# Patient Record
Sex: FEMALE | URBAN - METROPOLITAN AREA
[De-identification: names, ages, dates, MRNs, and addresses within clinical notes are randomized per-mention and may not be internally consistent; named-entity substitution may affect disease eponyms.]

---

## 2019-02-07 ENCOUNTER — TELEPHONE (OUTPATIENT)
Dept: NEUROLOGY | Age: 59
End: 2019-02-07

## 2019-04-17 ENCOUNTER — OFFICE VISIT (OUTPATIENT)
Dept: NEUROLOGY | Age: 59
End: 2019-04-17
Payer: COMMERCIAL

## 2019-04-17 VITALS
WEIGHT: 101 LBS | BODY MASS INDEX: 20.36 KG/M2 | SYSTOLIC BLOOD PRESSURE: 120 MMHG | HEIGHT: 59 IN | DIASTOLIC BLOOD PRESSURE: 80 MMHG

## 2019-04-17 DIAGNOSIS — G62.9 NEUROPATHY: Chronic | ICD-10-CM

## 2019-04-17 DIAGNOSIS — G57.93 NEUROPATHIC PAIN OF BOTH FEET: Chronic | ICD-10-CM

## 2019-04-17 PROCEDURE — 99215 OFFICE O/P EST HI 40 MIN: CPT | Performed by: PSYCHIATRY & NEUROLOGY

## 2019-04-17 RX ORDER — DULOXETIN HYDROCHLORIDE 60 MG/1
60 CAPSULE, DELAYED RELEASE ORAL DAILY
Refills: 0 | COMMUNITY
Start: 2019-04-08

## 2019-04-17 RX ORDER — RANITIDINE 150 MG/1
TABLET ORAL
Refills: 12 | COMMUNITY
Start: 2019-03-28

## 2019-04-17 RX ORDER — CROMOLYN SODIUM 100 MG/5ML
SOLUTION, CONCENTRATE ORAL
Refills: 5 | COMMUNITY
Start: 2019-03-27

## 2019-04-17 RX ORDER — LISINOPRIL 2.5 MG/1
5 TABLET ORAL DAILY
COMMUNITY
Start: 2019-01-24

## 2019-04-17 RX ORDER — ABACAVIR 300 MG/1
300 TABLET ORAL DAILY
COMMUNITY

## 2019-04-17 RX ORDER — LANSOPRAZOLE 15 MG/1
30 CAPSULE, DELAYED RELEASE ORAL DAILY
COMMUNITY

## 2019-04-17 RX ORDER — METOPROLOL TARTRATE 50 MG/1
50 TABLET, FILM COATED ORAL 2 TIMES DAILY
COMMUNITY

## 2019-04-17 RX ORDER — GABAPENTIN 300 MG/1
CAPSULE ORAL
Refills: 0 | COMMUNITY
Start: 2019-04-14

## 2019-04-17 RX ORDER — CARBINOXAMINE MALEATE 4 MG/1
4 TABLET ORAL 2 TIMES DAILY
COMMUNITY

## 2019-04-17 RX ORDER — LEVOTHYROXINE SODIUM 88 UG/1
88 TABLET ORAL
COMMUNITY
Start: 2016-11-04

## 2019-04-17 RX ORDER — DOXYCYCLINE HYCLATE 100 MG/1
CAPSULE ORAL
Refills: 2 | COMMUNITY
Start: 2019-03-28

## 2019-04-17 ASSESSMENT — ENCOUNTER SYMPTOMS
ALLERGIC/IMMUNOLOGIC NEGATIVE: 1
GASTROINTESTINAL NEGATIVE: 1
EYES NEGATIVE: 1
RESPIRATORY NEGATIVE: 1

## 2019-04-17 NOTE — PROGRESS NOTES
Neurology Consult Note:    Patient: Chan Gallagher  : 1960  Date: 19  Referring provider:  Hermelinda Canchola [facility]. Referral to Neurology: History of small fiber peripheral neuropathy, chronic neuropathic pain of feet x 4 yrs. Note: Prior patient of Dr. Selina Plasencia, Neurology, [facility]. Dear Dr. Enoc Ricketts;     I have seen Chan Gallagher a 18-year-old woman who is a former patient of Dr. Selina Plasencia and was apparently diagnosed with a small fiber peripheral polyneuropathy and chronic neuropathic pain syndrome of her feet x 4 yrs. and then had a change of insurances, thus was scheduled in my office. She also is noted to have been a patient at the [facility] in 2016 for neuropathy. We have no records from the [facility] correlating with the above. She has no primary medical physician listed or on file, except reports she sees a dr. Enoc Ricketts every few months in [facility]. She also reports a history of POTS. She describes neuropathy symptoms of her feet x 4 yrs. without apparent progression of symptoms when questioned further. She denies cervical or lumbar radicular symptoms. She did not have a skin biopsy under Dr. Selina Plasencia as the patient reports she did not feel this was necessary nor would change treatment. She is looking for chronic pain management. She has a history of fibromyalgia which I do not treat as a neurologist. She reports that her primary medical doctor is prescribing Cymbalta 60 mg daily. She states that she has only 9 days of gabapentin left. I have advised her that I do not prescribe gabapentin for chronic pain and discussed that there are prescribing limitations in [place] of which she was aware. She was also treated with amitriptyline 50 mg at bedtime previously in 2018 per Dr. Selina Plasencia. Lab Data: none on file.  (Patient states she had numerous labs done at the [facility] in 2016, and could determine no specific cause for her neuropathy.)    Medical records reviewed from [facility] [place] [id number], Dr. Melissa Langford, 10/4/18. Dr. Mykel Caal note indicates that patient reported she could be having an adverse interaction to gabapentin stating that she felt \"horrible\" in the morning after taking her gabapentin and complained of tingling and burning of her legs and feet that did not not want to discontinue gabapentin, was advised then to take it at bedtime. NCS/EMG study completed 5/10/17, of the lower extremities, Dr. Melissa Langford: no clear evidence of neuropathic, myopathic, motor nerve root pathology or large fiber neuropathy. Current Outpatient Medications   Medication Sig Dispense Refill    ascorbic acid (VITAMIN C) 1000 MG tablet Take 1,000 mg by mouth daily      Probiotic Product (ALIGN PO) Take by mouth daily       No current facility-administered medications for this visit. No Known Allergies    Patient Active Problem List   Diagnosis    Neuropathy    Neuropathic pain of both feet       Past Medical History:   Diagnosis Date    Neuropathic pain of both feet 4/17/2019    Neuropathy 4/17/2019       No past surgical history on file. No family history on file. Denies a family history of chronic neuromuscular conditions or hereditary neuropathy.     Social History     Socioeconomic History    Marital status: Unknown     Spouse name: Not on file    Number of children: Not on file    Years of education: Not on file    Highest education level: Not on file   Occupational History    Not on file   Social Needs    Financial resource strain: Not on file    Food insecurity:     Worry: Not on file     Inability: Not on file    Transportation needs:     Medical: Not on file     Non-medical: Not on file   Tobacco Use    Smoking status: Never Smoker    Smokeless tobacco: Never Used   Substance and Sexual Activity    Alcohol use: Not Currently    Drug use: Not Currently    Sexual activity: Not on file   Lifestyle    Physical activity:     Days per week: Not on file     Minutes per session: Not on file    Stress: Not on file   Relationships    Social connections:     Talks on phone: Not on file     Gets together: Not on file     Attends Anabaptism service: Not on file     Active member of club or organization: Not on file     Attends meetings of clubs or organizations: Not on file     Relationship status: Not on file    Intimate partner violence:     Fear of current or ex partner: Not on file     Emotionally abused: Not on file     Physically abused: Not on file     Forced sexual activity: Not on file   Other Topics Concern    Not on file   Social History Narrative    Not on file     Review of Systems   Constitutional: Negative. HENT: Negative. Eyes: Negative. Respiratory: Negative. Cardiovascular: Negative. Gastrointestinal: Negative. Endocrine: Negative. Genitourinary: Negative. Musculoskeletal:        Chronic neuropathic pain of feet, small fiber neuropathy by history   Skin: Negative. Allergic/Immunologic: Negative. Neurological: Negative. Hematological: Negative. Psychiatric/Behavioral: Positive for dysphoric mood. The patient is nervous/anxious. All other systems reviewed and are negative. Neurologic Exam:  /80 (Site: Right Upper Arm, Position: Sitting, Cuff Size: Medium Adult)   Ht 4' 11\" (1.499 m)   Wt 101 lb (45.8 kg)   BMI 20.40 kg/m²   General appearance: Alert, cooperative, well-nourished, well-groomed, anxious, mildly agitated, seated in the exam room in the company of her spells, no acute distress  HEENT: Normocephalic/atraumatic. Neck: Supple  Cardiac: RRR  Respiratory: grossly clear  Extremities: No edema, erythema, cyanosis  Skin: No apparent lesions or rashes  Musculoskeletal: Negative bilateral straight leg raising test, no fasciculations, tremors or foot drop, no truncal sensory level.   Mental Status: Alert, oriented ×3  Speech/Language: Clear, grossly fluent  Attention span/Concentration: Grossly intact  Affect/Mood: Mildly anxious, dysthymic, mildly agitated  Insight/Judgement: Appears fairly good     Fund of Knowledge/Current events: Unable to accurately assess  CN II-XII:     Pupils: Equal, reactive to light, 1.4 mm     EOM's: Full without nystagmus    Visual Fields: Grossly full to confrontation    Fundi: Miosis from light  CN V: normal V1-V3  CN VII: No facial droop, symmetric smile   CN VIII: Hearing grossly intact   CN IX-XII: No palatal asymmetry, tongue midline  SCM/Trapezii: 5/5 power  Motor: 5/5 power in the upper and lower extremities, somewhat effort related, without tremor or drift and normal motor tone without cogwheeling or spasticity. Intact fine motor function of both hands, symmetric. DTR's: 2+-2++ and symmetric in the upper and lower extremities, no ankle clonus, plantar responses are flexor. Sensory: Grossly intact subjectively to light touch and sharp stick testing, denies a truncal sensory level. Coordination/Gait: No gross limb dysmetria, truncal or cerebellar gait ataxia, two-step turns, normal tandem gait. Assessment/Plan:  1. 4- year history of possible small fiber neuropathy and chronic neuropathic pain syndrome involving both feet, former patient of Dr. Eduarda Patino, neurologist, [facility]. 2. History of fibromyalgia, POTS diagnosis given. 3. For chronic pain management, she would best be advised to be evaluated by a pain management specialist or may continue her long-term gabapentin prescription under your direction. She reports she is unable to return to the [facility] due to her medical insurance type. She is looking for chronic pain management and she has a history of fibromyalgia which I do not treat as a neurologist.      Sincerely,      Erika Peraza MD    This note was created using speech recognition transcription software. Despite proofreading, there may be several typographical errors present that may affect the meaning of the content.  Please call with any questions. Note: More than 50% of this 40-minute face-face visit time included counseling and coordination of care based on clinical impression, review of test results, treatment plan, risk factor reduction and patient and/or family education.